# Patient Record
Sex: MALE | URBAN - METROPOLITAN AREA
[De-identification: names, ages, dates, MRNs, and addresses within clinical notes are randomized per-mention and may not be internally consistent; named-entity substitution may affect disease eponyms.]

---

## 2023-01-13 ENCOUNTER — OFFICE (OUTPATIENT)
Dept: URBAN - METROPOLITAN AREA CLINIC 12 | Facility: CLINIC | Age: 48
Setting detail: OPHTHALMOLOGY
End: 2023-01-13
Payer: COMMERCIAL

## 2023-01-13 DIAGNOSIS — Z20.822: ICD-10-CM

## 2023-01-13 DIAGNOSIS — Z01.812: ICD-10-CM

## 2023-01-13 PROCEDURE — 99211 OFF/OP EST MAY X REQ PHY/QHP: CPT | Performed by: OPHTHALMOLOGY

## 2023-01-13 ASSESSMENT — VISUAL ACUITY
OS_BCVA: 20/
OD_BCVA: 20/

## 2023-02-14 PROBLEM — Z00.00 ENCOUNTER FOR PREVENTIVE HEALTH EXAMINATION: Status: ACTIVE | Noted: 2023-02-14

## 2023-02-16 ENCOUNTER — APPOINTMENT (OUTPATIENT)
Dept: ORTHOPEDIC SURGERY | Facility: CLINIC | Age: 48
End: 2023-02-16
Payer: MEDICAID

## 2023-02-16 ENCOUNTER — INPATIENT (INPATIENT)
Facility: HOSPITAL | Age: 48
LOS: 0 days | Discharge: HOME | DRG: 603 | End: 2023-02-17
Attending: HOSPITALIST | Admitting: HOSPITALIST
Payer: MEDICAID

## 2023-02-16 VITALS
WEIGHT: 158 LBS | TEMPERATURE: 98.1 F | SYSTOLIC BLOOD PRESSURE: 135 MMHG | BODY MASS INDEX: 26.33 KG/M2 | HEIGHT: 65 IN | HEART RATE: 73 BPM | DIASTOLIC BLOOD PRESSURE: 87 MMHG

## 2023-02-16 VITALS
TEMPERATURE: 99 F | HEIGHT: 67 IN | RESPIRATION RATE: 16 BRPM | DIASTOLIC BLOOD PRESSURE: 84 MMHG | WEIGHT: 158.07 LBS | SYSTOLIC BLOOD PRESSURE: 145 MMHG | OXYGEN SATURATION: 97 % | HEART RATE: 107 BPM

## 2023-02-16 DIAGNOSIS — Z78.9 OTHER SPECIFIED HEALTH STATUS: ICD-10-CM

## 2023-02-16 DIAGNOSIS — L03.90 CELLULITIS, UNSPECIFIED: ICD-10-CM

## 2023-02-16 LAB
ALBUMIN SERPL ELPH-MCNC: 4.1 G/DL — SIGNIFICANT CHANGE UP (ref 3.3–5.2)
ALP SERPL-CCNC: 105 U/L — SIGNIFICANT CHANGE UP (ref 40–120)
ALT FLD-CCNC: 79 U/L — HIGH
ANION GAP SERPL CALC-SCNC: 11 MMOL/L — SIGNIFICANT CHANGE UP (ref 5–17)
AST SERPL-CCNC: 75 U/L — HIGH
BASOPHILS # BLD AUTO: 0.03 K/UL — SIGNIFICANT CHANGE UP (ref 0–0.2)
BASOPHILS NFR BLD AUTO: 0.6 % — SIGNIFICANT CHANGE UP (ref 0–2)
BILIRUB SERPL-MCNC: 0.3 MG/DL — LOW (ref 0.4–2)
BUN SERPL-MCNC: 18 MG/DL — SIGNIFICANT CHANGE UP (ref 8–20)
CALCIUM SERPL-MCNC: 9.2 MG/DL — SIGNIFICANT CHANGE UP (ref 8.4–10.5)
CHLORIDE SERPL-SCNC: 99 MMOL/L — SIGNIFICANT CHANGE UP (ref 96–108)
CO2 SERPL-SCNC: 29 MMOL/L — SIGNIFICANT CHANGE UP (ref 22–29)
CREAT SERPL-MCNC: 0.8 MG/DL — SIGNIFICANT CHANGE UP (ref 0.5–1.3)
EGFR: 109 ML/MIN/1.73M2 — SIGNIFICANT CHANGE UP
EOSINOPHIL # BLD AUTO: 0.02 K/UL — SIGNIFICANT CHANGE UP (ref 0–0.5)
EOSINOPHIL NFR BLD AUTO: 0.4 % — SIGNIFICANT CHANGE UP (ref 0–6)
GLUCOSE SERPL-MCNC: 92 MG/DL — SIGNIFICANT CHANGE UP (ref 70–99)
HCT VFR BLD CALC: 41.7 % — SIGNIFICANT CHANGE UP (ref 39–50)
HGB BLD-MCNC: 13.8 G/DL — SIGNIFICANT CHANGE UP (ref 13–17)
IMM GRANULOCYTES NFR BLD AUTO: 0.2 % — SIGNIFICANT CHANGE UP (ref 0–0.9)
LYMPHOCYTES # BLD AUTO: 1.1 K/UL — SIGNIFICANT CHANGE UP (ref 1–3.3)
LYMPHOCYTES # BLD AUTO: 21.2 % — SIGNIFICANT CHANGE UP (ref 13–44)
MCHC RBC-ENTMCNC: 31.9 PG — SIGNIFICANT CHANGE UP (ref 27–34)
MCHC RBC-ENTMCNC: 33.1 GM/DL — SIGNIFICANT CHANGE UP (ref 32–36)
MCV RBC AUTO: 96.5 FL — SIGNIFICANT CHANGE UP (ref 80–100)
MONOCYTES # BLD AUTO: 0.23 K/UL — SIGNIFICANT CHANGE UP (ref 0–0.9)
MONOCYTES NFR BLD AUTO: 4.4 % — SIGNIFICANT CHANGE UP (ref 2–14)
NEUTROPHILS # BLD AUTO: 3.79 K/UL — SIGNIFICANT CHANGE UP (ref 1.8–7.4)
NEUTROPHILS NFR BLD AUTO: 73.2 % — SIGNIFICANT CHANGE UP (ref 43–77)
PLATELET # BLD AUTO: 364 K/UL — SIGNIFICANT CHANGE UP (ref 150–400)
POTASSIUM SERPL-MCNC: 3.8 MMOL/L — SIGNIFICANT CHANGE UP (ref 3.5–5.3)
POTASSIUM SERPL-SCNC: 3.8 MMOL/L — SIGNIFICANT CHANGE UP (ref 3.5–5.3)
PROT SERPL-MCNC: 8.1 G/DL — SIGNIFICANT CHANGE UP (ref 6.6–8.7)
RBC # BLD: 4.32 M/UL — SIGNIFICANT CHANGE UP (ref 4.2–5.8)
RBC # FLD: 12.4 % — SIGNIFICANT CHANGE UP (ref 10.3–14.5)
SODIUM SERPL-SCNC: 139 MMOL/L — SIGNIFICANT CHANGE UP (ref 135–145)
WBC # BLD: 5.18 K/UL — SIGNIFICANT CHANGE UP (ref 3.8–10.5)
WBC # FLD AUTO: 5.18 K/UL — SIGNIFICANT CHANGE UP (ref 3.8–10.5)

## 2023-02-16 PROCEDURE — 73600 X-RAY EXAM OF ANKLE: CPT | Mod: 26,LT

## 2023-02-16 PROCEDURE — 99285 EMERGENCY DEPT VISIT HI MDM: CPT

## 2023-02-16 PROCEDURE — 99222 1ST HOSP IP/OBS MODERATE 55: CPT

## 2023-02-16 PROCEDURE — 73610 X-RAY EXAM OF ANKLE: CPT | Mod: LT

## 2023-02-16 PROCEDURE — 93971 EXTREMITY STUDY: CPT | Mod: 26,LT

## 2023-02-16 PROCEDURE — 73590 X-RAY EXAM OF LOWER LEG: CPT | Mod: 26,LT

## 2023-02-16 PROCEDURE — 99203 OFFICE O/P NEW LOW 30 MIN: CPT

## 2023-02-16 PROCEDURE — 73630 X-RAY EXAM OF FOOT: CPT | Mod: 26,LT

## 2023-02-16 RX ORDER — VANCOMYCIN HCL 1 G
1000 VIAL (EA) INTRAVENOUS ONCE
Refills: 0 | Status: COMPLETED | OUTPATIENT
Start: 2023-02-16 | End: 2023-02-16

## 2023-02-16 RX ADMIN — Medication 1000 MILLIGRAM(S): at 21:38

## 2023-02-16 RX ADMIN — Medication 250 MILLIGRAM(S): at 20:38

## 2023-02-16 NOTE — ED PROVIDER NOTE - CONSIDERATION OF ADMISSION OBSERVATION
OBS considered in clinical setting of failed ABx use jamari greater then 48 hr pt would likely benefit from formal admission. Consideration of Admission/Observation

## 2023-02-16 NOTE — ED ADULT NURSE REASSESSMENT NOTE - NS ED NURSE REASSESS COMMENT FT1
assumed care from SAGE SY  pt resting comfortably in stretcher.  denies any complaints.  NAD at this time.

## 2023-02-16 NOTE — ED ADULT NURSE NOTE - CHIEF COMPLAINT QUOTE
Patient was at [facility] and was told to come to the hospital because his ankle has been swollen. States that he had an ultrasound and that it was negative for a DVT. Patient was told that he may have cellulitis, was placed on antibiotics on Monday. Patient states that he has had swelling Saturday night. Patient able to ambulate.

## 2023-02-16 NOTE — H&P ADULT - ASSESSMENT
Pt. is a 46 y/o male presents to the ED with complaint of Lt foot and leg swelling , redness and pain x 5 days. Pt states that he had a gradual onset of pain,  redness and swelling over Lt lower leg and foot. pt. not sure how or what triggered this. no fall, no traums to LLE. no insect bite or walk to wooded area as per pt. pt. saw urgent care cneter few days ago and was put on augmentin which he used for 2 days. Today he went to see orthopedic ( leopoldo ) and pt. was asked to see his pcp or ER for cellulitis. As per pt. his xryas of foot and leg, DVT study have been ok. pt. afebrile in the ER. pt. thinks his lt. foot and lt. lower leg is a little less red and less swollen but not significant improvement. pt. will be admitted for LLE cellulitis.

## 2023-02-16 NOTE — ED ADULT NURSE NOTE - OBJECTIVE STATEMENT
Patient was seen at urgent care and sent to  [facility] orthopedics for redness and swelling to he LLE.  Pt had a sono and xray and they were both negative.  Pt was put on Augmentin for cellulitis and was told to come to ED after 48hours because his foot was not getting better.  #20G IV inserted to [facility], labs sent.  One dose of Vancomycin given, pt awaiting disposition.  Pt denies pain.

## 2023-02-16 NOTE — H&P ADULT - NSHPPHYSICALEXAM_GEN_ALL_CORE
Vital Signs Last 24 Hrs  T(C): 36.8 (16 Feb 2023 23:44), Max: 37.1 (16 Feb 2023 16:21)  T(F): 98.2 (16 Feb 2023 23:44), Max: 98.8 (16 Feb 2023 16:21)  HR: 65 (16 Feb 2023 23:44) (65 - 107)  BP: 161/91 (16 Feb 2023 23:44) (145/84 - 161/91)  BP(mean): --  RR: 18 (16 Feb 2023 23:44) (16 - 18)  SpO2: 100% (16 Feb 2023 23:44) (97% - 100%)    Parameters below as of 16 Feb 2023 23:44  Patient On (Oxygen Delivery Method): room air    General: pt. in bed not in distress  eyes :  PERRL. intact EOM.  HENT: AT, NC. no throat erythema or exudate.   Neck: supple. no JVD.   Chest: CTA bilaterally, no w /r/r.  Heart: S1,S2. RRR. no heart murmur. no edema.  Abdomen: soft. non-tender. non-distended. + BS.   Ext: no calf tenderness on either side. ROM of all ext. intact.  vascular : Distal pulses intact B/L.  Neuro: AAO x3. no focal weakness. no speech disorder, cns ii to xii intact  Skin: warm and dry , Left foot dorsal aspect swelling , erythema over dorsal aspect of foot up to mid calf area over anterior aspect . mild warmth , no open wounds. grayish pseudomembrane between 4th and 5th toes b/L.   psych : mood ok, no si/hi

## 2023-02-16 NOTE — ED PROVIDER NOTE - ATTENDING APP SHARED VISIT CONTRIBUTION OF CARE
PT with no SPMHx presents to the ED with complaint of Lt foot and leg swelling and pain x 5 days. Pt states that he had a gradual onset of pain redness  and swelling over Lt lower leg. Pt states that he was seen in [facility] 3 dy[place] ago for this was placed on Augmentin that he has taken 5 doses of with worsening redness swelling and pain. Pt states that he had a DVT study and xray that were all normal from [facility], was seen by ortho today that recommended he present to the ED. Pt dines fever, chills, weakness, numbness, tingling HA, dizziness, SOB, diff breathing.    + cellulitis on exam. given failure of outpatient abx will obtain labs and admit for IV abx. xrays neg for air

## 2023-02-16 NOTE — ED PROVIDER NOTE - NS ED ATTENDING STATEMENT MOD
This was a shared visit with the AYESHA. I reviewed and verified the documentation and independently performed the documented:

## 2023-02-16 NOTE — HISTORY OF PRESENT ILLNESS
[de-identified] : 02/16/2023 : DAYTON CARY  is a 47 year  old male who presents to the office for evaluation of his left ankle today.  He states that he is a very active person and does 2 days of workout in the morning and in the evening with strength training and high intensity interval training respectively.  He states he does not recall any acute traumatic injury but this Friday he noticed that he was having some pain in his ankle.  He states the next day on Saturday he had a lot of swelling and went to an urgent care.  He states that they sent him antibiotics with Augmentin and sent him for a Doppler to rule out a DVT.  He states the Doppler was negative for any DVTs and he has been taking the Augmentin but has not noticed any improvement in the pain and swelling into the foot.  He denies any other medical issues and has not not had any recent tick bites and has no previous diagnosis of anything rheumatological.  He denies a history of gout.  He states he has a lot of pain and swelling and redness into the foot and ankle.  He denies any fevers, chills, chest pain, shortness of breath.  He denies any numbness or tingling distally.  He is here for specialist opinion today.

## 2023-02-16 NOTE — ED PROVIDER NOTE - CLINICAL SUMMARY MEDICAL DECISION MAKING FREE TEXT BOX
Pt with failed trial of PO ABx, discussed with hospitalist, that agrees pt warrants  admission for further evaluation and treatment, pt made aware of need for admission and possible further treatments, pt states that they agree with need for admission and they understand all results and possible treatments. PT will be admitted to hospitalist team and care will be transferred to admitting team.

## 2023-02-16 NOTE — H&P ADULT - PROBLEM SELECTOR PLAN 2
skin break from left 4-5 th toe area triggered cellulitis ? will keep on local antifungal at this point.

## 2023-02-16 NOTE — ED PROVIDER NOTE - NS ED ROS FT
ROS: CONTUSIONAL: Denies fever, chills, fatigue, wt loss. HEAD: Denies trauma, HA, Dizziness. EYE: Denies Acute visual changes, diplopia. ENMT: Denies change in hearing, tinnitus, epistaxis, difficulty swallowing, sore throat. CARDIO: Denies CP, palpitations, edema. RESP: Denies Cough, SOB , Diff breathing, hemoptysis. GI: Denies N/V, ABD pain, change in bowel movement. URINARY: Denies difficulty urinating, pelvic pain. MS:  Denies joint pain, back pain, weakness, decreased ROM, swelling. NEURO: Denies change in gait, seizures, loss of sensation, dizziness, confusion LOC.  PSY: NO SI/HI. SKIN: Rash Denies , bruising.

## 2023-02-16 NOTE — ED ADULT TRIAGE NOTE - CHIEF COMPLAINT QUOTE
Patient was at [facility] and was told to come to the hospital because his ankle has been swollen. States that he had an ultrasound and that it was negative for a DVT. Patient was told that he may have cellulitis, was placed on antibiotics on Monday. Patient states that he has had swelling Saturday night. Patient able to ambulate.

## 2023-02-16 NOTE — PHYSICAL EXAM
[de-identified] : General:\par Awake, alert, no acute distress, Patient was cooperative and appropriate during the examination.\par \par The patient is of normal weight for height and age.\par \par Walks without an antalgic gait.\par \par Full, painless range of motion of the neck and back.\par \par Exam of the bilateral lower extremities is intact and symmetric with regards to dermatologic, vascular, and neurologic exam. Bilateral lower extremity sensation is grossly intact to light touch in the DP/SP/T/S/S nerve distributions. Intact DF/PF/EHL. BIlateral lower extremities warm and well-perfused with brisk capillary refill.\par \par \par Pulmonary:\par Regular, nonlabored breathing\par \par Abdomen:\par Soft, nontender, nondistended.\par \par Lymphatic:\par No evidence of axillary lymphadenopathy\par \par Left ankle Examination:\par Physical examination of the ankle demonstrates normal skin without signs of skin changes or abnormalities.  There is moderate erythema about the foot and ankle with moderate to severe swelling about the foot and ankle with pitting edema noted.  No area of obvious fluid collection.  Patient has pitting edema.  Erythema is streaking proximally up the leg.  Area of maximal erythema is on the distal medial aspect of the ankle and this area is tender to palpation.\par  \par Sensation is intact to light touch L2-S1\par Palpable DP/PT pulse\par EHL/FHL/TA/GSC motor function intact\par  \par Range of Motion:\par Dorsiflexion 10\par Plantarflexion 45\par Inversion 20\par Eversion 20\par  \par Strength Testing\par Dorsiflexion 5/5\par Plantarflexion 5/5\par Inversion 5/5\par Eversion 5/5\par  \par Palpation\par Mildly tender to palpation over the lateral malleolus\par Not tender to palpation over the medial malleolus\par Mildly tender to palpation over the ATFL, CFL, PTFL\par Not tender to palpation over the calcaneal tuberosity\par Mildly tender to palpation over the peroneal tendons\par Not tender to palpation over the tarsals, metatarsals, or phalanges\par Not palpable defect within the achilles tendon\par  \par Special Tests:\par Ankle anterior drawer negative\par Squeeze test negative\par Perez's test negative [de-identified] : X-ray 3 views of the left ankle taken in the office today on 2/16/2023 reveals no acute fracture or dislocation.\par \par Doppler taken at  radiology previously on 2/13/2023 shows no evidence of DVT.

## 2023-02-16 NOTE — ED PROVIDER NOTE - OBJECTIVE STATEMENT
PT with no SPMHx presents to the ED with complaint of Lt foot and leg swelling and pain x 5 days. Pt states that he had a gradual onset of pain redness  and swelling over Lt lower leg. Pt states that he was seen in [facility] 3 dyas ago for this was placed on Augmentin that he has taken 5 doses of with worsening redness swelling and pain. Pt states that he had a DVT study and xray that were all normal from [facility], was seen by ortho today that recommended he present to the ED. Pt dines fever, chills, weakness, numbness, tingling HA, dizziness, SOB, diff breathing.

## 2023-02-16 NOTE — DISCUSSION/SUMMARY
[de-identified] : Assessment: Patient is a 47-year-old male with left foot and ankle erythema and swelling, likely cellulitis\par \par Plan: I had a long discussion with the patient today regarding the nature of their diagnosis and treatment plan. We discussed the risks and benefits of no treatment as well as nonoperative and operative treatments.  I reviewed the x-rays today as well as the ultrasound that showed no acute bony pathology and no evidence of DVT.  On examination he has severe swelling and redness about the foot and ankle with pitting edema.  Although he has no systemic signs or symptoms of illness his exam is concerning for a worsening cellulitis.  He has not responded to oral antibiotics.  He has no discernible orthopedic injury.  At this point I recommend going to the emergency room for medical evaluation and consideration for intravenous antibiotics for his cellulitis.  He may bear weight as tolerated.  The area of erythema was demarcated today in the office.\par  \par The patient verbalizes their understanding and agrees with the plan.  He will go directly to the emergency room.  All questions were answered to their satisfaction.

## 2023-02-16 NOTE — H&P ADULT - HISTORY OF PRESENT ILLNESS
PT with no SPMHx presents to the ED with complaint of Lt foot and leg swelling and pain x 5 days. Pt states that he had a gradual onset of pain redness  and swelling over Lt lower leg. Pt states that he [place] [facility] [place] for this was placed on Augmentin that he has taken 5 doses of with worsening redness swelling and pain. Pt states that he had a DVT study and xray that were all normal [place] [facility] was seen by ortho today that recommended he present to the ED. Pt dines fever, chills, weakness, numbness, tingling HA, dizziness, SOB, diff breathing. Pt. is a 48 y/o male presents to the ED with complaint of Lt foot and leg swelling , redness and pain x 5 days. Pt states that he had a gradual onset of pain,  redness and swelling over Lt lower leg and foot. pt. not sure how or what triggered this. no fall, no traums to LLE. no insect bite or walk to wooded area as per pt. pt. saw urgent care cneter few days ago and was put on augmentin which he used for 2 days. Today he went to see orthopedic ( leopoldo ) and pt. was asked to see his pcp or ER for cellulitis. As per pt. his xryas of foot and leg, DVT study have been ok. pt. afebrile in the ER. pt. thinks his lt. foot and lt. lower leg is a little less red and less swollen but not significant improvement. no calf pain on either side. no cp. no sob. no abd. pain, no n/v/d. pt. does not take any prescribed meds on regular basis.

## 2023-02-16 NOTE — H&P ADULT - PROBLEM SELECTOR PLAN 1
admit to medicine.   will keep on cefazolin  follow cultures  warm compresses  elevation of LLE  clinically non toxic looking.

## 2023-02-17 ENCOUNTER — TRANSCRIPTION ENCOUNTER (OUTPATIENT)
Age: 48
End: 2023-02-17

## 2023-02-17 VITALS
SYSTOLIC BLOOD PRESSURE: 116 MMHG | TEMPERATURE: 98 F | OXYGEN SATURATION: 96 % | DIASTOLIC BLOOD PRESSURE: 66 MMHG | RESPIRATION RATE: 18 BRPM | HEART RATE: 72 BPM

## 2023-02-17 DIAGNOSIS — B35.3 TINEA PEDIS: ICD-10-CM

## 2023-02-17 DIAGNOSIS — Z90.89 ACQUIRED ABSENCE OF OTHER ORGANS: Chronic | ICD-10-CM

## 2023-02-17 DIAGNOSIS — Z98.890 OTHER SPECIFIED POSTPROCEDURAL STATES: Chronic | ICD-10-CM

## 2023-02-17 DIAGNOSIS — L03.119 CELLULITIS OF UNSPECIFIED PART OF LIMB: ICD-10-CM

## 2023-02-17 LAB
ALBUMIN SERPL ELPH-MCNC: 3.5 G/DL — SIGNIFICANT CHANGE UP (ref 3.3–5.2)
ALP SERPL-CCNC: 85 U/L — SIGNIFICANT CHANGE UP (ref 40–120)
ALT FLD-CCNC: 60 U/L — HIGH
ANION GAP SERPL CALC-SCNC: 10 MMOL/L — SIGNIFICANT CHANGE UP (ref 5–17)
AST SERPL-CCNC: 50 U/L — HIGH
BASOPHILS # BLD AUTO: 0.02 K/UL — SIGNIFICANT CHANGE UP (ref 0–0.2)
BASOPHILS NFR BLD AUTO: 0.4 % — SIGNIFICANT CHANGE UP (ref 0–2)
BILIRUB SERPL-MCNC: 0.3 MG/DL — LOW (ref 0.4–2)
BUN SERPL-MCNC: 14.9 MG/DL — SIGNIFICANT CHANGE UP (ref 8–20)
CALCIUM SERPL-MCNC: 9.2 MG/DL — SIGNIFICANT CHANGE UP (ref 8.4–10.5)
CHLORIDE SERPL-SCNC: 101 MMOL/L — SIGNIFICANT CHANGE UP (ref 96–108)
CO2 SERPL-SCNC: 28 MMOL/L — SIGNIFICANT CHANGE UP (ref 22–29)
CREAT SERPL-MCNC: 0.77 MG/DL — SIGNIFICANT CHANGE UP (ref 0.5–1.3)
EGFR: 110 ML/MIN/1.73M2 — SIGNIFICANT CHANGE UP
EOSINOPHIL # BLD AUTO: 0.05 K/UL — SIGNIFICANT CHANGE UP (ref 0–0.5)
EOSINOPHIL NFR BLD AUTO: 0.9 % — SIGNIFICANT CHANGE UP (ref 0–6)
GLUCOSE SERPL-MCNC: 98 MG/DL — SIGNIFICANT CHANGE UP (ref 70–99)
HAV IGM SER-ACNC: SIGNIFICANT CHANGE UP
HBV CORE IGM SER-ACNC: SIGNIFICANT CHANGE UP
HBV SURFACE AG SER-ACNC: SIGNIFICANT CHANGE UP
HCT VFR BLD CALC: 37 % — LOW (ref 39–50)
HCV AB S/CO SERPL IA: 0.12 S/CO — SIGNIFICANT CHANGE UP (ref 0–0.99)
HCV AB SERPL-IMP: SIGNIFICANT CHANGE UP
HGB BLD-MCNC: 12.2 G/DL — LOW (ref 13–17)
IMM GRANULOCYTES NFR BLD AUTO: 0.2 % — SIGNIFICANT CHANGE UP (ref 0–0.9)
LYMPHOCYTES # BLD AUTO: 1.39 K/UL — SIGNIFICANT CHANGE UP (ref 1–3.3)
LYMPHOCYTES # BLD AUTO: 26 % — SIGNIFICANT CHANGE UP (ref 13–44)
MCHC RBC-ENTMCNC: 31.6 PG — SIGNIFICANT CHANGE UP (ref 27–34)
MCHC RBC-ENTMCNC: 33 GM/DL — SIGNIFICANT CHANGE UP (ref 32–36)
MCV RBC AUTO: 95.9 FL — SIGNIFICANT CHANGE UP (ref 80–100)
MONOCYTES # BLD AUTO: 0.44 K/UL — SIGNIFICANT CHANGE UP (ref 0–0.9)
MONOCYTES NFR BLD AUTO: 8.2 % — SIGNIFICANT CHANGE UP (ref 2–14)
NEUTROPHILS # BLD AUTO: 3.43 K/UL — SIGNIFICANT CHANGE UP (ref 1.8–7.4)
NEUTROPHILS NFR BLD AUTO: 64.3 % — SIGNIFICANT CHANGE UP (ref 43–77)
PLATELET # BLD AUTO: 302 K/UL — SIGNIFICANT CHANGE UP (ref 150–400)
POTASSIUM SERPL-MCNC: 3.9 MMOL/L — SIGNIFICANT CHANGE UP (ref 3.5–5.3)
POTASSIUM SERPL-SCNC: 3.9 MMOL/L — SIGNIFICANT CHANGE UP (ref 3.5–5.3)
PROT SERPL-MCNC: 6.7 G/DL — SIGNIFICANT CHANGE UP (ref 6.6–8.7)
RAPID RVP RESULT: SIGNIFICANT CHANGE UP
RBC # BLD: 3.86 M/UL — LOW (ref 4.2–5.8)
RBC # FLD: 12.4 % — SIGNIFICANT CHANGE UP (ref 10.3–14.5)
SARS-COV-2 RNA SPEC QL NAA+PROBE: SIGNIFICANT CHANGE UP
SODIUM SERPL-SCNC: 139 MMOL/L — SIGNIFICANT CHANGE UP (ref 135–145)
WBC # BLD: 5.34 K/UL — SIGNIFICANT CHANGE UP (ref 3.8–10.5)
WBC # FLD AUTO: 5.34 K/UL — SIGNIFICANT CHANGE UP (ref 3.8–10.5)

## 2023-02-17 PROCEDURE — 80074 ACUTE HEPATITIS PANEL: CPT

## 2023-02-17 PROCEDURE — 99239 HOSP IP/OBS DSCHRG MGMT >30: CPT

## 2023-02-17 PROCEDURE — 0225U NFCT DS DNA&RNA 21 SARSCOV2: CPT

## 2023-02-17 PROCEDURE — 85025 COMPLETE CBC W/AUTO DIFF WBC: CPT

## 2023-02-17 PROCEDURE — 73590 X-RAY EXAM OF LOWER LEG: CPT

## 2023-02-17 PROCEDURE — 36415 COLL VENOUS BLD VENIPUNCTURE: CPT

## 2023-02-17 PROCEDURE — 80053 COMPREHEN METABOLIC PANEL: CPT

## 2023-02-17 PROCEDURE — 73630 X-RAY EXAM OF FOOT: CPT

## 2023-02-17 PROCEDURE — 99285 EMERGENCY DEPT VISIT HI MDM: CPT | Mod: 25

## 2023-02-17 PROCEDURE — 96365 THER/PROPH/DIAG IV INF INIT: CPT

## 2023-02-17 PROCEDURE — 93971 EXTREMITY STUDY: CPT

## 2023-02-17 PROCEDURE — 73600 X-RAY EXAM OF ANKLE: CPT

## 2023-02-17 PROCEDURE — 87040 BLOOD CULTURE FOR BACTERIA: CPT

## 2023-02-17 RX ORDER — ENOXAPARIN SODIUM 100 MG/ML
40 INJECTION SUBCUTANEOUS EVERY 24 HOURS
Refills: 0 | Status: DISCONTINUED | OUTPATIENT
Start: 2023-02-17 | End: 2023-02-17

## 2023-02-17 RX ORDER — CEFAZOLIN SODIUM 1 G
1000 VIAL (EA) INJECTION EVERY 8 HOURS
Refills: 0 | Status: DISCONTINUED | OUTPATIENT
Start: 2023-02-17 | End: 2023-02-17

## 2023-02-17 RX ORDER — ACETAMINOPHEN 500 MG
2 TABLET ORAL
Qty: 0 | Refills: 0 | DISCHARGE
Start: 2023-02-17

## 2023-02-17 RX ORDER — ACETAMINOPHEN 500 MG
650 TABLET ORAL EVERY 6 HOURS
Refills: 0 | Status: DISCONTINUED | OUTPATIENT
Start: 2023-02-17 | End: 2023-02-17

## 2023-02-17 RX ORDER — LACTOBACILLUS ACIDOPHILUS 100MM CELL
1 CAPSULE ORAL
Qty: 20 | Refills: 0
Start: 2023-02-17 | End: 2023-02-26

## 2023-02-17 RX ADMIN — Medication 1000 MILLIGRAM(S): at 05:53

## 2023-02-17 RX ADMIN — Medication 1 APPLICATION(S): at 05:53

## 2023-02-17 RX ADMIN — Medication 1000 MILLIGRAM(S): at 00:39

## 2023-02-17 RX ADMIN — Medication 1000 MILLIGRAM(S): at 13:02

## 2023-02-17 RX ADMIN — ENOXAPARIN SODIUM 40 MILLIGRAM(S): 100 INJECTION SUBCUTANEOUS at 05:54

## 2023-02-17 NOTE — DISCHARGE NOTE PROVIDER - ATTENDING DISCHARGE PHYSICAL EXAMINATION:
PHYSICAL EXAM:  General: NAD, A/O x 3  ENT: MMM, no thrush  Neck: Supple, No JVD  Lungs: Clear to auscultation bilaterally, good air entry, non-labored breathing  Cardio: +s1/s2  Abdomen: Soft, Nontender, Nondistended; Bowel sounds present  Extremities: No calf tenderness, +right foot minimal edema w/ erythema was marked previously and has markedly shrunk in size from that

## 2023-02-17 NOTE — PATIENT PROFILE ADULT - FALL HARM RISK - HARM RISK INTERVENTIONS

## 2023-02-17 NOTE — DISCHARGE NOTE PROVIDER - CARE PROVIDER_API CALL
Christopher Ambrocio (DPM)  Podiatric Medicine and Surgery  [address] [place] [address]  Phone: [phone]  Fax: [phone]  Follow Up Time:

## 2023-02-17 NOTE — PROGRESS NOTE ADULT - ASSESSMENT
46 y/o male presents to the ED with complaint of Lt foot and leg swelling , redness and pain x 5 days.     #Cellulitis- leg  - monitor cultures   - cefazolin-> change to bactrim on d/c  - local wound care    patient stating speaking to family

## 2023-02-17 NOTE — DISCHARGE NOTE PROVIDER - NSDCMRMEDTOKEN_GEN_ALL_CORE_FT
acetaminophen 325 mg oral tablet: 2 tab(s) orally every 6 hours, As needed, Temp greater or equal to 38C (100.4F), Mild Pain (1 - 3), Moderate Pain (4 - 6)  Bactrim  mg-160 mg oral tablet: 1 tab(s) orally every 12 hours   lactobacillus acidophilus oral capsule: 1 cap(s) orally every 12 hours

## 2023-02-17 NOTE — DISCHARGE NOTE PROVIDER - NSDCCPCAREPLAN_GEN_ALL_CORE_FT
PRINCIPAL DISCHARGE DIAGNOSIS  Diagnosis: Cellulitis  Assessment and Plan of Treatment: - take medications as rx  - followup with podiatry  - elevate legs (ie-recliner for next 2 days when sitting)  - warm and cold compresses 5 times a day

## 2023-02-17 NOTE — PROGRESS NOTE ADULT - SUBJECTIVE AND OBJECTIVE BOX
Patient is a 47y old  Male who presents with a chief complaint of LLE cellulitis (16 Feb 2023 23:50)    Patient seen and examined at bedside.      ALLERGIES:  No Known Allergies    MEDICATIONS  (STANDING):  ceFAZolin  Injectable. 1000 milliGRAM(s) IV Push every 8 hours  clotrimazole 1% Cream 1 Application(s) Topical every 12 hours  enoxaparin Injectable 40 milliGRAM(s) SubCutaneous every 24 hours    MEDICATIONS  (PRN):  acetaminophen     Tablet .. 650 milliGRAM(s) Oral every 6 hours PRN Temp greater or equal to 38C (100.4F), Mild Pain (1 - 3), Moderate Pain (4 - 6)    Vital Signs Last 24 Hrs  T(F): 98.2 (17 Feb 2023 07:48), Max: 98.8 (16 Feb 2023 16:21)  HR: 72 (17 Feb 2023 07:48) (55 - 107)  BP: 124/79 (17 Feb 2023 07:48) (115/76 - 161/91)  RR: 18 (17 Feb 2023 07:48) (16 - 18)  SpO2: 99% (17 Feb 2023 07:48) (97% - 100%)  I&O's Summary    PHYSICAL EXAM:  General: NAD, A/O x 3  ENT: MMM, no thrush  Neck: Supple, No JVD  Lungs: Clear to auscultation bilaterally, good air entry, non-labored breathing  Cardio: +s1/s2  Abdomen: Soft, Nontender, Nondistended; Bowel sounds present  Extremities: No calf tenderness, +right foot minimal edema w/ erythema was marked previously and has markedly shrunk in size from that     LABS:                        12.2   5.34  )-----------( 302      ( 17 Feb 2023 02:43 )             37.0     02-17    139  |  101  |  14.9  ----------------------------<  98  3.9   |  28.0  |  0.77    Ca    9.2      17 Feb 2023 02:43    TPro  6.7  /  Alb  3.5  /  TBili  0.3  /  DBili  x   /  AST  50  /  ALT  60  /  AlkPhos  85  02-17    RADIOLOGY & ADDITIONAL TESTS:  - no new tests

## 2023-02-17 NOTE — DISCHARGE NOTE NURSING/CASE MANAGEMENT/SOCIAL WORK - PATIENT PORTAL LINK FT
You can access the FollowMyHealth Patient Portal offered by [facility] by registering at the following website: [email or web address]. By joining [employer]’s FollowMyHealth portal, you will also be able to view your health information using other applications (apps) compatible with our system.

## 2023-02-17 NOTE — DISCHARGE NOTE NURSING/CASE MANAGEMENT/SOCIAL WORK - NSDCPEFALRISK_GEN_ALL_CORE
For information on Fall & Injury Prevention, visit: [email or web address] OR  [email or web address] OR  https://www.cdc.gov/steadi/patient.html

## 2023-02-22 LAB
CULTURE RESULTS: SIGNIFICANT CHANGE UP
CULTURE RESULTS: SIGNIFICANT CHANGE UP
SPECIMEN SOURCE: SIGNIFICANT CHANGE UP
SPECIMEN SOURCE: SIGNIFICANT CHANGE UP

## 2023-03-28 ENCOUNTER — TRANSCRIPTION ENCOUNTER (OUTPATIENT)
Age: 48
End: 2023-03-28

## 2025-05-12 NOTE — ED ADULT NURSE NOTE - CCCP TRG CHIEF CMPLNT
swelling of lower extremities Instructions: This plan will send the code FBSE to the PM system.  DO NOT or CHANGE the price. Price (Do Not Change): 0.00 Detail Level: Simple